# Patient Record
Sex: FEMALE | Race: WHITE | NOT HISPANIC OR LATINO | Employment: STUDENT | URBAN - METROPOLITAN AREA
[De-identification: names, ages, dates, MRNs, and addresses within clinical notes are randomized per-mention and may not be internally consistent; named-entity substitution may affect disease eponyms.]

---
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
## 2025-05-13 ENCOUNTER — APPOINTMENT (OUTPATIENT)
Dept: PRIMARY CARE | Facility: CLINIC | Age: 20
End: 2025-05-13
Payer: COMMERCIAL

## 2025-05-20 ENCOUNTER — APPOINTMENT (OUTPATIENT)
Dept: PRIMARY CARE | Facility: CLINIC | Age: 20
End: 2025-05-20
Payer: COMMERCIAL

## 2025-05-30 ENCOUNTER — APPOINTMENT (OUTPATIENT)
Dept: PRIMARY CARE | Facility: CLINIC | Age: 20
End: 2025-05-30
Payer: COMMERCIAL

## 2025-05-30 VITALS
WEIGHT: 144.2 LBS | BODY MASS INDEX: 22.63 KG/M2 | DIASTOLIC BLOOD PRESSURE: 56 MMHG | HEIGHT: 67 IN | SYSTOLIC BLOOD PRESSURE: 110 MMHG

## 2025-05-30 DIAGNOSIS — J20.9 ACUTE BRONCHITIS, UNSPECIFIED ORGANISM: Primary | ICD-10-CM

## 2025-05-30 DIAGNOSIS — J06.9 UPPER RESPIRATORY TRACT INFECTION, UNSPECIFIED TYPE: ICD-10-CM

## 2025-05-30 PROCEDURE — 3008F BODY MASS INDEX DOCD: CPT | Performed by: INTERNAL MEDICINE

## 2025-05-30 PROCEDURE — 99213 OFFICE O/P EST LOW 20 MIN: CPT | Performed by: INTERNAL MEDICINE

## 2025-05-30 RX ORDER — AMOXICILLIN AND CLAVULANATE POTASSIUM 875; 125 MG/1; MG/1
875 TABLET, FILM COATED ORAL 2 TIMES DAILY
Qty: 20 TABLET | Refills: 0 | Status: SHIPPED | OUTPATIENT
Start: 2025-05-30 | End: 2025-06-09

## 2025-05-30 RX ORDER — METHYLPREDNISOLONE 4 MG/1
TABLET ORAL
Qty: 21 TABLET | Refills: 0 | Status: SHIPPED | OUTPATIENT
Start: 2025-05-30

## 2025-05-30 RX ORDER — AZITHROMYCIN 250 MG/1
TABLET, FILM COATED ORAL
Qty: 6 TABLET | Refills: 0 | Status: CANCELLED | OUTPATIENT
Start: 2025-05-30 | End: 2025-06-04

## 2025-05-30 NOTE — PROGRESS NOTES
"Subjective   Patient ID: BRANDT Hogan is a 20 y.o. female who presents for Cough (Patient stated that she was to come in for dry patch on eye lid and that has cleared up but she is now sick with wheezing, coughing , shortness of breathe and is tired all the time Patient states that she gets dizzy when standing ).    HPI   Patient is here with complaints of having sickness wheezing coughing.  Having chest congestion.  Denies having history of asthma  Complaining of dry patch of the skin on the eyelid she thinks it is related to eyelash collar now it is better     Past recap  Patient is here for follow-up on bronchitis.  Thursday was in the urgent care having cough shortness of breath only with deep breathing congestion.  Today finish prednisone but still not better     patient is here with complaints of having right knee pain and lower back pain.  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  Denies any injury    Past recap   to establish PCP  Complaining of feeling sick since Thursday.  She hurts in the abdomen whenever she eats hurts on the left upper quadrant.  Having sinus congestion postnasal drainage sore throat.  He was seen in urgent care on Friday given school note but no treatment  Patient took mom's PPI which helped    Past medical history: None  Social history: Vapes, no alcohol does use weed  Occupation: High school student and works at [employer]     Review of Systems    Objective   /56   Ht 1.702 m (5' 7\")   Wt 65.4 kg (144 lb 3.2 oz)   BMI 22.58 kg/m²     Physical Exam  Vitals reviewed.   Constitutional:       Appearance: Normal appearance.   HENT:      Head: Normocephalic and atraumatic.      Right Ear: Tympanic membrane, ear canal and external ear normal.      Left Ear: Tympanic membrane, ear canal and external ear normal.      Nose: No congestion.      Mouth/Throat:      Pharynx: Oropharynx is clear.   Eyes:      Extraocular Movements: Extraocular movements intact.      " Conjunctiva/sclera: Conjunctivae normal.      Pupils: Pupils are equal, round, and reactive to light.   Cardiovascular:      Rate and Rhythm: Normal rate and regular rhythm.      Pulses: Normal pulses.      Heart sounds: Normal heart sounds.   Pulmonary:      Effort: Pulmonary effort is normal.      Breath sounds: Wheezing and rhonchi present.   Abdominal:      General: Abdomen is flat. Bowel sounds are normal.      Palpations: Abdomen is soft.   Musculoskeletal:      Cervical back: Normal range of motion and neck supple.   Skin:     General: Skin is warm and dry.   Neurological:      General: No focal deficit present.      Mental Status: She is alert and oriented to person, place, and time.   Psychiatric:         Mood and Affect: Mood normal.         Assessment/Plan   Problem List Items Addressed This Visit    None  Visit Diagnoses         Codes      Acute bronchitis, unspecified organism    -  Primary J20.9      Upper respiratory tract infection, unspecified type     J06.9    Relevant Medications    methylPREDNISolone (Medrol Dospak) 4 mg tablets    amoxicillin-clavulanate (Augmentin) 875-125 mg tablet          Past recap  Knee joint is fine she has tenderness about the patella  Most likely she has tightness in her hamstring and quads  Appears to be more soft tissue injuries and is joint movements in the back movement full range of motion  Refer to physical therapy in the knee and back  Take anti-inflammatory for 2 weeks Relafen prescribed  Refer to sports medicine    11/12/2024  Breathing treatment given  Solu-Medrol 125 mg IM shot given  Augmentin 875 for 10 days  Chest x-ray ordered  Medrol Dosepak  Encouraged to cut down on vaping  Follow-up if not better    5/30/2025  Treated with Augmentin and prednisone  Continue albuterol inhaler  Patient I suspect his underlying asthma  Advised patient to follow-up when she is better for physical and maybe will consider doing lung function test

## 2025-08-31 ENCOUNTER — OFFICE VISIT (OUTPATIENT)
Dept: URGENT CARE | Age: 20
End: 2025-08-31
Payer: COMMERCIAL